# Patient Record
Sex: FEMALE | Race: BLACK OR AFRICAN AMERICAN | Employment: UNEMPLOYED | URBAN - METROPOLITAN AREA
[De-identification: names, ages, dates, MRNs, and addresses within clinical notes are randomized per-mention and may not be internally consistent; named-entity substitution may affect disease eponyms.]

---

## 2017-07-27 DIAGNOSIS — N89.8 VAGINAL DISCHARGE: ICD-10-CM

## 2017-07-27 DIAGNOSIS — Z20.2 EXPOSURE TO STD: ICD-10-CM

## 2017-07-27 LAB
CLUE CELLS: ABNORMAL
TRICHOMONAS PREP: ABNORMAL
TRICHOMONAS VAGINALIS SCREEN: NEGATIVE
YEAST WET PREP: ABNORMAL

## 2017-07-29 LAB
GENITAL CULTURE, ROUTINE: ABNORMAL
ORGANISM: ABNORMAL
ORGANISM: ABNORMAL
URINE CULTURE, ROUTINE: NORMAL

## 2017-08-01 ENCOUNTER — TELEPHONE (OUTPATIENT)
Dept: PRIMARY CARE CLINIC | Age: 27
End: 2017-08-01

## 2017-08-01 DIAGNOSIS — B37.31 YEAST INFECTION INVOLVING THE VAGINA AND SURROUNDING AREA: Primary | ICD-10-CM

## 2017-08-01 LAB
C TRACH DNA GENITAL QL NAA+PROBE: NEGATIVE
N. GONORRHOEAE DNA: NEGATIVE

## 2017-08-01 RX ORDER — FLUCONAZOLE 150 MG/1
150 TABLET ORAL ONCE
Qty: 1 TABLET | Refills: 0 | Status: SHIPPED | OUTPATIENT
Start: 2017-08-01 | End: 2017-08-01

## 2018-08-16 ENCOUNTER — HOSPITAL ENCOUNTER (EMERGENCY)
Age: 28
Discharge: HOME | End: 2018-08-16
Payer: COMMERCIAL

## 2018-08-16 VITALS
HEART RATE: 71 BPM | RESPIRATION RATE: 16 BRPM | WEIGHT: 145 LBS | DIASTOLIC BLOOD PRESSURE: 83 MMHG | TEMPERATURE: 98.6 F | OXYGEN SATURATION: 100 % | SYSTOLIC BLOOD PRESSURE: 110 MMHG | BODY MASS INDEX: 22.38 KG/M2

## 2018-08-16 DIAGNOSIS — N89.8 VAGINAL DISCHARGE: ICD-10-CM

## 2018-08-16 DIAGNOSIS — R30.0 DYSURIA: Primary | ICD-10-CM

## 2018-08-16 LAB
BILIRUBIN URINE: NEGATIVE
BLOOD, URINE: NEGATIVE
CHP ED QC CHECK: NORMAL
CLARITY: ABNORMAL
CLUE CELLS: NORMAL
COLOR: YELLOW
GLUCOSE URINE: NEGATIVE MG/DL
KETONES, URINE: NEGATIVE MG/DL
LEUKOCYTE ESTERASE, URINE: NEGATIVE
NITRITE, URINE: NEGATIVE
PH UA: 5.5 (ref 5–9)
PREGNANCY TEST URINE, POC: NEGATIVE
PROTEIN UA: NEGATIVE MG/DL
SPECIFIC GRAVITY UA: 1.03 (ref 1–1.03)
TRICHOMONAS PREP: NORMAL
TRICHOMONAS VAGINALIS SCREEN: NEGATIVE
URINE REFLEX TO CULTURE: ABNORMAL
UROBILINOGEN, URINE: 0.2 E.U./DL
YEAST WET PREP: NORMAL

## 2018-08-16 PROCEDURE — 99284 EMERGENCY DEPT VISIT MOD MDM: CPT

## 2018-08-16 PROCEDURE — 87210 SMEAR WET MOUNT SALINE/INK: CPT

## 2018-08-16 PROCEDURE — 81003 URINALYSIS AUTO W/O SCOPE: CPT

## 2018-08-16 PROCEDURE — 87591 N.GONORRHOEAE DNA AMP PROB: CPT

## 2018-08-16 PROCEDURE — 87660 TRICHOMONAS VAGIN DIR PROBE: CPT

## 2018-08-16 PROCEDURE — 6370000000 HC RX 637 (ALT 250 FOR IP): Performed by: NURSE PRACTITIONER

## 2018-08-16 PROCEDURE — 87491 CHLMYD TRACH DNA AMP PROBE: CPT

## 2018-08-16 RX ORDER — CHOLECALCIFEROL (VITAMIN D3) 125 MCG
500 CAPSULE ORAL DAILY
COMMUNITY

## 2018-08-16 RX ORDER — IBUPROFEN 800 MG/1
800 TABLET ORAL ONCE
Status: COMPLETED | OUTPATIENT
Start: 2018-08-16 | End: 2018-08-16

## 2018-08-16 RX ADMIN — IBUPROFEN 800 MG: 800 TABLET, FILM COATED ORAL at 09:06

## 2018-08-16 ASSESSMENT — PAIN SCALES - GENERAL
PAINLEVEL_OUTOF10: 10
PAINLEVEL_OUTOF10: 10

## 2018-08-16 ASSESSMENT — ENCOUNTER SYMPTOMS
EYE PAIN: 0
SORE THROAT: 0
RHINORRHEA: 0
ABDOMINAL PAIN: 1
VOMITING: 0
SHORTNESS OF BREATH: 0
PHOTOPHOBIA: 0
NAUSEA: 0
BACK PAIN: 0
DIARRHEA: 0
COUGH: 0

## 2018-08-16 ASSESSMENT — PAIN DESCRIPTION - LOCATION: LOCATION: OTHER (COMMENT)

## 2018-08-16 ASSESSMENT — PAIN DESCRIPTION - PAIN TYPE: TYPE: ACUTE PAIN

## 2018-08-16 NOTE — ED NOTES
Discharge instructions provided to patient. Patient has no questions. Pt informed to f/u with OB/GYN. Discharge signed. Pt ambulatory out of ED with steady gait.      Gold Lofton RN  08/16/18 9611

## 2018-08-16 NOTE — ED PROVIDER NOTES
tenderness. on exam the abdomen is soft, non-tender with good bowel sounds. There is no rebound, rigidity or guarding. No Peritoneal signs. There is no CVA tenderness. There is no abdominal or flank ecchymosis. Genitourinary:   Genitourinary Comments: Speculum exam with primary nurse. There is vaginal discharge. Exam is otherwise grossly unremarkable   Neurological: She is alert and oriented to person, place, and time. She has normal strength. Skin: Skin is warm, dry and intact. No rash noted. She is not diaphoretic. Nursing note and vitals reviewed. DIAGNOSTIC RESULTS     EKG: All EKG's are interpreted by the Emergency Department Physician who either signs or Co-signs this chart in the absence of a cardiologist.        RADIOLOGY:   Non-plain film images such as CT, Ultrasound and MRI are read by the radiologist. Plain radiographic images are visualized and preliminarily interpreted by the emergency physician with the below findings:        Interpretation per the Radiologist below, if available at the time of this note:    No orders to display         ED BEDSIDE ULTRASOUND:   Performed by ED Physician - none    LABS:  Labs Reviewed   URINE RT REFLEX TO CULTURE - Abnormal; Notable for the following:        Result Value    Clarity, UA CLOUDY (*)     All other components within normal limits   POC PREGNANCY UR-QUAL - Normal   WET PREP, GENITAL   C.TRACHOMATIS N.GONORRHOEAE DNA   TRICHOMONAS BY EIA       All other labs were within normal range or not returned as of this dictation. EMERGENCY DEPARTMENT COURSE and DIFFERENTIAL DIAGNOSIS/MDM:   Vitals:    Vitals:    08/16/18 0739 08/16/18 0900   BP: 112/61 110/83   Pulse: 81 71   Resp: 18 16   Temp: 98.5 °F (36.9 °C) 98.6 °F (37 °C)   TempSrc: Temporal Oral   SpO2: 98% 100%   Weight: 145 lb (65.8 kg)             MDM on exam she is nontoxic in no distress. Her vital signs are normal.  Laboratory studies were ordered for evaluation of her complaint. She'll be reevaluated. ppatient is reevaluated. Her exam remains unremarkable. Studies were reviewed with her. She'll be discharged home in stable condition and advised to follow up wit ObGyn. Standard anticipatory guidance given to patient upon discharge. Have given them a specific time frame in which to follow-up and who to follow-up with. I have also advised them that they should return to the emergency department if they get worse, or not getting better or develop any new or concerning symptoms. Patient demonstrates understanding and all questions were answered. CRITICAL CARE TIME       CONSULTS:  None    PROCEDURES:  Unless otherwise noted below, none     Procedures    FINAL IMPRESSION      1. Dysuria    2.  Vaginal discharge          DISPOSITION/PLAN   DISPOSITION Decision To Discharge 08/16/2018 08:55:40 AM      PATIENT REFERRED TO:  [facility]            DISCHARGE MEDICATIONS:  New Prescriptions    No medications on file          (Please note that portions of this note were completed with a voice recognition program.  Efforts were made to edit the dictations but occasionally words are mis-transcribed.)    MARQUISE Ng CNP (electronically signed)  Attending Emergency Physician          MARQUISE Ng CNP  08/16/18 0678

## 2018-08-16 NOTE — ED NOTES
Labs collected and sent. Vaginal exam done by Cj Ashby NP. Cultures sent.      Rosibel Ross RN  08/16/18 5961

## 2018-08-22 LAB
C TRACH DNA GENITAL QL NAA+PROBE: POSITIVE
N. GONORRHOEAE DNA: NEGATIVE

## 2018-10-23 ENCOUNTER — HOSPITAL ENCOUNTER (EMERGENCY)
Age: 28
Discharge: HOME | End: 2018-10-23
Payer: COMMERCIAL

## 2018-10-23 VITALS
TEMPERATURE: 98 F | RESPIRATION RATE: 20 BRPM | HEART RATE: 71 BPM | SYSTOLIC BLOOD PRESSURE: 120 MMHG | WEIGHT: 153 LBS | HEIGHT: 68 IN | OXYGEN SATURATION: 100 % | BODY MASS INDEX: 23.19 KG/M2 | DIASTOLIC BLOOD PRESSURE: 68 MMHG

## 2018-10-23 DIAGNOSIS — R35.0 URINARY FREQUENCY: Primary | ICD-10-CM

## 2018-10-23 DIAGNOSIS — N89.8 VAGINAL DISCHARGE: ICD-10-CM

## 2018-10-23 LAB
BILIRUBIN URINE: NEGATIVE
BLOOD, URINE: NEGATIVE
CHP ED QC CHECK: YES
CLARITY: ABNORMAL
COLOR: YELLOW
GLUCOSE BLD-MCNC: 80 MG/DL
GLUCOSE BLD-MCNC: 80 MG/DL (ref 60–115)
GLUCOSE URINE: NEGATIVE MG/DL
KETONES, URINE: NEGATIVE MG/DL
LEUKOCYTE ESTERASE, URINE: NEGATIVE
NITRITE, URINE: NEGATIVE
PERFORMED ON: NORMAL
PH UA: 7 (ref 5–9)
PROTEIN UA: NEGATIVE MG/DL
SPECIFIC GRAVITY UA: 1.03 (ref 1–1.03)
URINE REFLEX TO CULTURE: ABNORMAL
UROBILINOGEN, URINE: 1 E.U./DL

## 2018-10-23 PROCEDURE — 96372 THER/PROPH/DIAG INJ SC/IM: CPT

## 2018-10-23 PROCEDURE — 81003 URINALYSIS AUTO W/O SCOPE: CPT

## 2018-10-23 PROCEDURE — 87491 CHLMYD TRACH DNA AMP PROBE: CPT

## 2018-10-23 PROCEDURE — 6360000002 HC RX W HCPCS: Performed by: PHYSICIAN ASSISTANT

## 2018-10-23 PROCEDURE — 99283 EMERGENCY DEPT VISIT LOW MDM: CPT

## 2018-10-23 PROCEDURE — 87591 N.GONORRHOEAE DNA AMP PROB: CPT

## 2018-10-23 PROCEDURE — 2580000003 HC RX 258

## 2018-10-23 PROCEDURE — 6370000000 HC RX 637 (ALT 250 FOR IP): Performed by: PHYSICIAN ASSISTANT

## 2018-10-23 RX ORDER — CEFTRIAXONE SODIUM 250 MG/1
250 INJECTION, POWDER, FOR SOLUTION INTRAMUSCULAR; INTRAVENOUS ONCE
Status: COMPLETED | OUTPATIENT
Start: 2018-10-23 | End: 2018-10-23

## 2018-10-23 RX ORDER — AZITHROMYCIN 500 MG/1
1000 TABLET, FILM COATED ORAL DAILY
Status: COMPLETED | OUTPATIENT
Start: 2018-10-23 | End: 2018-10-23

## 2018-10-23 RX ADMIN — CEFTRIAXONE SODIUM 250 MG: 250 INJECTION, POWDER, FOR SOLUTION INTRAMUSCULAR; INTRAVENOUS at 17:15

## 2018-10-23 RX ADMIN — AZITHROMYCIN 1000 MG: 500 TABLET, FILM COATED ORAL at 17:15

## 2018-10-23 RX ADMIN — WATER 10 ML: 1 INJECTION INTRAMUSCULAR; INTRAVENOUS; SUBCUTANEOUS at 17:16

## 2018-10-23 ASSESSMENT — PAIN DESCRIPTION - LOCATION: LOCATION: VAGINA

## 2018-10-23 ASSESSMENT — PAIN DESCRIPTION - FREQUENCY: FREQUENCY: INTERMITTENT

## 2018-10-23 ASSESSMENT — ENCOUNTER SYMPTOMS
NAUSEA: 0
ABDOMINAL DISTENTION: 0
CONSTIPATION: 0
COLOR CHANGE: 0
SHORTNESS OF BREATH: 0
VOMITING: 0
RHINORRHEA: 0
ABDOMINAL PAIN: 0
EYE DISCHARGE: 0
DIARRHEA: 0
BACK PAIN: 0
SORE THROAT: 0

## 2018-10-23 ASSESSMENT — PAIN DESCRIPTION - PAIN TYPE: TYPE: ACUTE PAIN

## 2018-10-23 ASSESSMENT — PAIN DESCRIPTION - ONSET: ONSET: ON-GOING

## 2018-10-23 ASSESSMENT — PAIN DESCRIPTION - PROGRESSION: CLINICAL_PROGRESSION: GRADUALLY WORSENING

## 2018-10-23 ASSESSMENT — PAIN DESCRIPTION - DESCRIPTORS: DESCRIPTORS: PRESSURE

## 2018-10-23 ASSESSMENT — PAIN SCALES - GENERAL: PAINLEVEL_OUTOF10: 10

## 2018-10-23 NOTE — ED PROVIDER NOTES
exhibits no distension and no mass. There is no tenderness. There is no guarding. Musculoskeletal: Normal range of motion. She exhibits no edema or deformity. Neurological: She is oriented to person, place, and time. Coordination normal.       DIAGNOSTIC RESULTS     EKG: All EKG's are interpreted by the Emergency Department Physician who either signs or Co-signsthis chart in the absence of a cardiologist.        RADIOLOGY:   Ramon Collier such as CT, Ultrasound and MRI are read by the radiologist. Plain radiographic images are visualized and preliminarily interpreted by the emergency physician with the below findings:        Interpretation per the Radiologist below, if available at the time ofthis note:    No orders to display         ED BEDSIDE ULTRASOUND:   Performed by ED Physician - none    LABS:  Labs Reviewed   URINE RT REFLEX TO CULTURE - Abnormal; Notable for the following:        Result Value    Clarity, UA CLOUDY (*)     All other components within normal limits   POCT GLUCOSE - Normal   C.TRACHOMATIS N.GONORRHOEAE DNA, URINE   POCT GLUCOSE       All other labs were within normal range or not returned as of this dictation. EMERGENCY DEPARTMENT COURSE and DIFFERENTIAL DIAGNOSIS/MDM:   Vitals:    Vitals:    10/23/18 1545   BP: 116/80   Pulse: 78   Resp: 18   Temp: 97.8 °F (36.6 °C)   TempSrc: Oral   SpO2: 100%   Weight: 153 lb (69.4 kg)   Height: 5' 7.5\" (1.715 m)            MDM  Number of Diagnoses or Management Options  Diagnosis management comments: After initial evaluation the patient with a complaint of frequent urination urinalysis was sent off which has come back negative I also did a blood sugar check which came back negative as well at 80 mg/dL. Back in to discuss with the patient concerns were negative signs of urinary tract infection i.e. asked the patient if she was concerned about sexually transmitted diseases and she said that she was.   She states that her friend was just tested

## 2018-10-23 NOTE — ED NOTES
POCT Glucose done by this ED Tech. POCT Glucose results [id number] [facility] notified.       Rohini Moore  10/23/18 3708

## 2018-10-29 LAB
C. TRACHOMATIS DNA ,URINE: NEGATIVE
N. GONORRHOEAE DNA, URINE: NEGATIVE

## 2018-12-23 ENCOUNTER — OFFICE VISIT (OUTPATIENT)
Dept: PRIMARY CARE CLINIC | Age: 28
End: 2018-12-23
Payer: COMMERCIAL

## 2018-12-23 VITALS
HEART RATE: 90 BPM | WEIGHT: 156 LBS | BODY MASS INDEX: 23.64 KG/M2 | SYSTOLIC BLOOD PRESSURE: 120 MMHG | TEMPERATURE: 98.4 F | OXYGEN SATURATION: 98 % | DIASTOLIC BLOOD PRESSURE: 76 MMHG | RESPIRATION RATE: 16 BRPM | HEIGHT: 68 IN

## 2018-12-23 DIAGNOSIS — A08.4 VIRAL GASTROENTERITIS: Primary | ICD-10-CM

## 2018-12-23 PROCEDURE — G8420 CALC BMI NORM PARAMETERS: HCPCS | Performed by: NURSE PRACTITIONER

## 2018-12-23 PROCEDURE — G8427 DOCREV CUR MEDS BY ELIG CLIN: HCPCS | Performed by: NURSE PRACTITIONER

## 2018-12-23 PROCEDURE — G8484 FLU IMMUNIZE NO ADMIN: HCPCS | Performed by: NURSE PRACTITIONER

## 2018-12-23 PROCEDURE — 4004F PT TOBACCO SCREEN RCVD TLK: CPT | Performed by: NURSE PRACTITIONER

## 2018-12-23 PROCEDURE — 99213 OFFICE O/P EST LOW 20 MIN: CPT | Performed by: NURSE PRACTITIONER

## 2018-12-23 RX ORDER — ONDANSETRON 4 MG/1
4 TABLET, ORALLY DISINTEGRATING ORAL EVERY 8 HOURS PRN
Qty: 42 TABLET | Refills: 0 | Status: SHIPPED | OUTPATIENT
Start: 2018-12-23

## 2018-12-23 ASSESSMENT — ENCOUNTER SYMPTOMS
PHOTOPHOBIA: 0
SHORTNESS OF BREATH: 0
BLOATING: 1
EYE PAIN: 0
RECTAL PAIN: 0
BACK PAIN: 0
NAUSEA: 1
BLOOD IN STOOL: 0
HEMATEMESIS: 0
DIARRHEA: 0
VOMITING: 1
ODYNOPHAGIA: 0
HEMATOCHEZIA: 0
EYE REDNESS: 0
TENESMUS: 0
ABDOMINAL PAIN: 1
ABDOMINAL DISTENTION: 1
CONSTIPATION: 0
EYE ITCHING: 0
EYE DISCHARGE: 0
JAUNDICE: 0
CHEST TIGHTNESS: 0
COUGH: 0

## 2018-12-23 ASSESSMENT — PATIENT HEALTH QUESTIONNAIRE - PHQ9
DEPRESSION UNABLE TO ASSESS: PT REFUSES
DEPRESSION UNABLE TO ASSESS: PT REFUSES

## 2018-12-23 NOTE — PATIENT INSTRUCTIONS
clean. Wash your hands, cutting boards, and countertops with hot soapy water frequently. · Cook meat until it is well done. · Do not eat raw eggs or uncooked sauces made with raw eggs. · Do not take chances. If food looks or tastes spoiled, throw it out. When should you call for help? Call 911 anytime you think you may need emergency care. For example, call if:    · You vomit blood or what looks like coffee grounds.     · You passed out (lost consciousness).     · You pass maroon or very bloody stools.    Call your doctor now or seek immediate medical care if:    · You have severe belly pain.     · You have signs of needing more fluids. You have sunken eyes, a dry mouth, and pass only a little dark urine.     · You feel like you are going to faint.     · You have increased belly pain that does not go away in 1 to 2 days.     · You have new or increased nausea, or you are vomiting.     · You have a new or higher fever.     · Your stools are black and tarlike or have streaks of blood.    Watch closely for changes in your health, and be sure to contact your doctor if:    · You are dizzy or lightheaded.     · You urinate less than usual, or your urine is dark yellow or brown.     · You do not feel better with each day that goes by. Where can you learn more? Go to https://UnveilpesalvadorIdealSeat.SimpleTuition. org and sign in to your [employer] account. Enter N142 in the [facility] box to learn more about \"Gastroenteritis: Care Instructions. \"     If you do not have an account, please click on the \"Sign Up Now\" link. Current as of: November 18, 2017  Content Version: 11.8  © 4395-0137 [employer]. Care instructions adapted under license by [facility] ([facility]). If you have questions about a medical condition or this instruction, always ask your healthcare professional. [address]  any warranty or liability for your use of this information.

## 2018-12-23 NOTE — PROGRESS NOTES
Subjective     Brittneylar Hilary Carballo 33 y.o. female presents 12/23/18 with   Chief Complaint   Patient presents with    Emesis     today pt started having nausea, vomitng, abdominal pain (cramping). GI Problem   The primary symptoms include fatigue, abdominal pain (cramping), nausea, vomiting (x4 today; improving as the day goes on) and myalgias. Primary symptoms do not include fever, weight loss, diarrhea, melena, hematemesis, jaundice, hematochezia, dysuria, arthralgias or rash. The illness began today. The onset was sudden. The problem has been gradually improving. The fatigue began today. The fatigue has been improving since its onset. The abdominal pain began today. The abdominal pain has been gradually improving since its onset. The abdominal pain is generalized. The abdominal pain does not radiate. The abdominal pain is relieved by certain positions. Nausea began today. The illness is also significant for anorexia and bloating. The illness does not include chills, dysphagia, odynophagia, constipation (did have BM this morning), tenesmus, back pain or itching. Reviewed the following history:    History reviewed. No pertinent past medical history. Past Surgical History:   Procedure Laterality Date    BREAST SURGERY  2001    L breast cyst removal     History reviewed. No pertinent family history. Allergies   Allergen Reactions    Sulfa Antibiotics        Current Outpatient Prescriptions   Medication Sig Dispense Refill    ondansetron (ZOFRAN ODT) 4 MG disintegrating tablet Take 1 tablet by mouth every 8 hours as needed for Nausea or Vomiting 42 tablet 0    vitamin B-12 (CYANOCOBALAMIN) 500 MCG tablet Take 500 mcg by mouth daily      vitamin D (CHOLECALCIFEROL) 1000 UNIT TABS tablet Take 1,000 Units by mouth daily       No current facility-administered medications for this visit. Review of Systems   Constitutional: Positive for appetite change and fatigue.  Negative for

## 2019-11-07 ENCOUNTER — HOSPITAL ENCOUNTER (OUTPATIENT)
Dept: NEUROLOGY | Age: 29
Discharge: HOME | End: 2019-11-07
Payer: COMMERCIAL

## 2019-11-07 PROCEDURE — 95816 EEG AWAKE AND DROWSY: CPT | Performed by: PSYCHIATRY & NEUROLOGY

## 2019-11-07 PROCEDURE — 95816 EEG AWAKE AND DROWSY: CPT

## 2019-12-19 LAB — HIV 1,2 COMBO ANTIGEN/ANTIBODY: NEGATIVE

## 2020-11-28 ENCOUNTER — APPOINTMENT (OUTPATIENT)
Dept: CT IMAGING | Age: 30
End: 2020-11-28
Payer: COMMERCIAL

## 2020-11-28 ENCOUNTER — HOSPITAL ENCOUNTER (EMERGENCY)
Age: 30
Discharge: HOME | End: 2020-11-28
Attending: EMERGENCY MEDICINE
Payer: COMMERCIAL

## 2020-11-28 VITALS
SYSTOLIC BLOOD PRESSURE: 115 MMHG | TEMPERATURE: 98.3 F | WEIGHT: 165 LBS | RESPIRATION RATE: 20 BRPM | OXYGEN SATURATION: 97 % | BODY MASS INDEX: 25.01 KG/M2 | HEIGHT: 68 IN | DIASTOLIC BLOOD PRESSURE: 87 MMHG | HEART RATE: 97 BPM

## 2020-11-28 LAB
ETHANOL PERCENT: 0.18 G/DL
ETHANOL: 202 MG/DL (ref 0–0.08)
HCG QUALITATIVE: POSITIVE

## 2020-11-28 PROCEDURE — 84703 CHORIONIC GONADOTROPIN ASSAY: CPT

## 2020-11-28 PROCEDURE — 70450 CT HEAD/BRAIN W/O DYE: CPT

## 2020-11-28 PROCEDURE — G0480 DRUG TEST DEF 1-7 CLASSES: HCPCS

## 2020-11-28 PROCEDURE — 99284 EMERGENCY DEPT VISIT MOD MDM: CPT

## 2020-11-28 PROCEDURE — 36415 COLL VENOUS BLD VENIPUNCTURE: CPT

## 2020-11-28 ASSESSMENT — ENCOUNTER SYMPTOMS
NAUSEA: 0
ABDOMINAL DISTENTION: 0
CHEST TIGHTNESS: 0
SORE THROAT: 0
PHOTOPHOBIA: 0
SHORTNESS OF BREATH: 0
VOMITING: 0
EYE DISCHARGE: 0
WHEEZING: 0
COUGH: 0
ABDOMINAL PAIN: 0

## 2020-11-28 NOTE — ED NOTES
Attempt to dopple FHT with no success. Patient encouraged by provider to follow-up with OB/GYN for further care.    Silas Gautam, RN  11/28/20 [address], RN  11/28/20 4613

## 2020-11-28 NOTE — ED NOTES
Patient agreeable to have blood drawn. Blood specimens obtained and sent to lab.      Parish Cuellar RN  11/28/20 8496

## 2020-11-28 NOTE — ED NOTES
Patient is refusing all care at this time. Attempt to arouse patient who answers \"ok\" to nurse requests but will not physically get up to perform tasks such as urine specimen for Urine preg test and CT.      Daniel Medrano, RN  11/28/20 Kylee Calix RN  11/28/20 2968

## 2020-11-28 NOTE — ED NOTES
Discharge instructions to patient who voices understanding. Patient is ambulatory from ED to lobby to wait for a ride.      Mariola Kumar RN  11/28/20 2465

## 2023-04-06 ENCOUNTER — HOSPITAL ENCOUNTER (OUTPATIENT)
Dept: DATA CONVERSION | Facility: HOSPITAL | Age: 33
End: 2023-04-06
Attending: PAIN MEDICINE | Admitting: PAIN MEDICINE
Payer: COMMERCIAL

## 2023-04-06 DIAGNOSIS — M47.816 SPONDYLOSIS WITHOUT MYELOPATHY OR RADICULOPATHY, LUMBAR REGION: ICD-10-CM

## 2023-04-06 DIAGNOSIS — Z88.2 ALLERGY STATUS TO SULFONAMIDES: ICD-10-CM

## 2023-05-25 LAB
CHLAMYDIA TRACH., AMPLIFIED: NEGATIVE
CLUE CELLS: PRESENT
N. GONORRHEA, AMPLIFIED: NEGATIVE
NUGENT SCORE: 8
TRICHOMONAS VAGINALIS: NEGATIVE
YEAST: ABNORMAL

## 2023-05-26 LAB — URINE CULTURE: NORMAL

## 2023-08-22 ENCOUNTER — HOSPITAL ENCOUNTER (OUTPATIENT)
Dept: DATA CONVERSION | Facility: HOSPITAL | Age: 33
End: 2023-08-22
Attending: PAIN MEDICINE | Admitting: PAIN MEDICINE
Payer: COMMERCIAL

## 2023-08-22 DIAGNOSIS — M47.816 SPONDYLOSIS WITHOUT MYELOPATHY OR RADICULOPATHY, LUMBAR REGION: ICD-10-CM

## 2023-10-01 NOTE — OP NOTE
PROCEDURE DETAILS    Preoperative Diagnosis:  Lumbar spondylosis, M47.816    Postoperative Diagnosis:  Lumbar spondylosis, M47.816    Surgeon: Nati Pastrana  Resident/Fellow/Other Assistant: Nita Cooper    Procedure:  Radiofrequency ablation bilateral   medial nerve branch L3-4 L4-5    Anesthesia: 2 mg of Versed   Estimated Blood Loss: 0  Findings: None         Operative Report:   Surgical Indications  The patient is here today to receive a radiofrequency ablation of the above-mentioned nerve to assist with the pain condition.    Operative Report  The patient was taken to the procedure room, was placed into a prone positioning. The skin was prepped and draped sterilely in the normal fashion.  Under fluoroscopic guidance the medial nerve root branches were identified and the patient was throughout the procedure monitored by the nursing staff. The skin and subcutaneous tissues were anesthetized with 1% lidocaine. Then 20-gauge RF needles were  introduced into the approximation of the medial nerve branches at the above mentioned level and confirmed in AP and oblique view, with negative aspiration of heme and CSF, with positive sensory stimulation and negative motor stimulation. Then the patient  received 1 mL of 0.5% bupivacaine per site and radiofrequency ablation at temperature of 80°C for 90 seconds was achieved. Patient tolerated the procedure well, was taken to the recovery room, allowed to recover for a sufficient amount of time and  then was discharged home in stable condition. The patient was advised to followup with the [facility] to reassess the improvement on as-needed basis. Take you for allowing me to participate in the care of this patient.                        Attestation:   Note Completion:  Attending Attestation I performed the procedure without a resident         Electronic Signatures:  Nati Pastrana)  (Signed 22-Aug-2023 08:52)   Authored: Post-Operative Note, Chart  Review, Note Completion      Last Updated: 22-Aug-2023 08:52 by Nati Pastrana)

## 2023-10-02 NOTE — OP NOTE
PROCEDURE DETAILS    Preoperative Diagnosis:  Lumbar spondylosis, M47.816    Postoperative Diagnosis:  Lumbar spondylosis, M47.816    Surgeon: Nati Pastrana  Resident/Fellow/Other Assistant: Nita Cooper    Procedure:  Medial nerve branch block bilateral L3-L4, L4-L5    Anesthesia: No anesthesiologist associated with this case  Estimated Blood Loss: 0  Findings: None         Operative Report:   Clinical Note  The patient is here today to receive diagnostic medial nerve branch block to assist with pain.    Procedure Note  The patient was taken to the procedure room, was placed into a prone position. The area was prepped and draped sterilely in the normal fashion. The patient was throughout the procedure monitored by the nursing staff. The skin and subcutaneous tissue was  anesthetized with 1% lidocaine. Then 22-gauge spinal needles were introduced into the approximation of the medial nerve branches at the above mentioned level, confirmed in AP and oblique view with negative aspiration of heme and CSF. The patient received  0.5 mL of 0.5% Marcaine per site. The patient tolerated the procedure well, was taken to the recovery room, allowed to recover for sufficient amount of time and then was discharged home in stable condition. The patient was advised to followup with the  [facility] to reassess the improvement and determine the need for the radiofrequency ablation.    Thank you for allowing me to participate in the care of this patient.                        Attestation:   Note Completion:  Attending Attestation I performed the procedure without a resident         Electronic Signatures:  Nati Pastrana)  (Signed 06-Apr-2023 10:14)   Authored: Post-Operative Note, Chart Review, Note Completion      Last Updated: 06-Apr-2023 10:14 by Nati Pastrana)

## 2023-10-24 ENCOUNTER — TELEPHONE (OUTPATIENT)
Dept: PAIN MEDICINE | Facility: CLINIC | Age: 33
End: 2023-10-24
Payer: COMMERCIAL

## 2023-12-22 ENCOUNTER — OFFICE VISIT (OUTPATIENT)
Dept: PAIN MEDICINE | Facility: CLINIC | Age: 33
End: 2023-12-22
Payer: COMMERCIAL

## 2023-12-22 VITALS
RESPIRATION RATE: 16 BRPM | DIASTOLIC BLOOD PRESSURE: 71 MMHG | HEART RATE: 72 BPM | SYSTOLIC BLOOD PRESSURE: 111 MMHG | TEMPERATURE: 96.4 F

## 2023-12-22 DIAGNOSIS — G89.29 OTHER CHRONIC PAIN: ICD-10-CM

## 2023-12-22 DIAGNOSIS — G89.29 CHRONIC BILATERAL LOW BACK PAIN, UNSPECIFIED WHETHER SCIATICA PRESENT: Primary | ICD-10-CM

## 2023-12-22 DIAGNOSIS — M06.9 RHEUMATOID ARTHRITIS INVOLVING MULTIPLE SITES, UNSPECIFIED WHETHER RHEUMATOID FACTOR PRESENT (MULTI): ICD-10-CM

## 2023-12-22 DIAGNOSIS — M54.50 LOW BACK PAIN, UNSPECIFIED: ICD-10-CM

## 2023-12-22 DIAGNOSIS — M54.50 CHRONIC BILATERAL LOW BACK PAIN, UNSPECIFIED WHETHER SCIATICA PRESENT: Primary | ICD-10-CM

## 2023-12-22 DIAGNOSIS — M54.50 CHRONIC BILATERAL LOW BACK PAIN WITHOUT SCIATICA: ICD-10-CM

## 2023-12-22 DIAGNOSIS — G89.29 CHRONIC BILATERAL LOW BACK PAIN WITHOUT SCIATICA: ICD-10-CM

## 2023-12-22 PROCEDURE — 99213 OFFICE O/P EST LOW 20 MIN: CPT | Mod: ZK

## 2023-12-22 PROCEDURE — 99213 OFFICE O/P EST LOW 20 MIN: CPT

## 2023-12-22 RX ORDER — LIDOCAINE 50 MG/G
1 PATCH TOPICAL DAILY
Qty: 30 PATCH | Refills: 2 | Status: SHIPPED | OUTPATIENT
Start: 2023-12-22 | End: 2024-01-21

## 2023-12-22 RX ORDER — GABAPENTIN 300 MG/1
300 CAPSULE ORAL 4 TIMES DAILY
Qty: 120 CAPSULE | Refills: 2 | Status: SHIPPED | OUTPATIENT
Start: 2023-12-22 | End: 2024-03-21

## 2023-12-22 ASSESSMENT — PAIN SCALES - GENERAL: PAINLEVEL: 10-WORST PAIN EVER

## 2023-12-22 NOTE — PROGRESS NOTES
Subjective   Patient ID: Chaparrita Larry is a 33 y.o. female who presents for Med Refill.  HPI      32 yo female presents today for medication refills. She is known to this clinic for chronic low back pain and is maintained on gabapentin 300mg TID. She reports that the medication is not as effective as it used to be in managing her pain. She denies any side effects associated with the usage of gabapentin. She reports that she was recently diagnosed with lupus and rheumatoid arthritis by her PCP, she is requesting a referral for rheumatology. She was started on hydroxychloroquine. Today she is rating her pain as 10/10, pain is in the low back and all over her body. Low back pain is described as achy and does not radiate. She had RFA bilateral medial nerve branch blocks L3- 4, L4- 5 in August 2023 and she denies any relief from this procedure. Noted on OAR[facility] Tramadol 50mg BID prescribed from multiple providers. Denies any recent falls or injuries, denies bowel on bladder incontinence.         Review of Systems    All 13 systems were reviewed and are within normal levels except as noted below or per HPI. Positive and pertinent negative responses are noted below or in the HPI   Denied any fever or chills. No weight loss and no night sweats. No cough or sputum production. No diarrhea   No bladder and bowel incontinence and no other changes in bladder and bowel. No skin changes. Reports tiredness and fatigability only if the pain is not controlled.   Denied opioids diversion and abuse and denies alcoholism. Denies overuse of the pain medications.     Objective   Physical Exam  General   Alert and oriented x4, pleasant and cooperative.      HEENT  Pupils are equal and normal in size. Ears, nose, mouth, and throat appear to be WNL.  Head atraumatic, symmetric.      No signs of sedation or signs of withdrawal apparent.     Psychiatric   No signs of depression apparent. Appropriate mood and affect.     Neuro   No focal  neurological deficit apparent. Ambulation     Respiratory  No respiratory distress, respirations equal and unlabored.     Abdomen  Soft and nontender, no distention noted.     Skin  Warm, dry and intact. No skin markings supportive of recent IV drug usage .            Assessment/Plan        32 yo female with history and physical exam supportive of chronic low back pain associated with lumbago and lumbar spondylosis. Multiple joint pain associated with rheumatoid arthritis.     Increase gabapentin to 300mg 4 times daily.   Advised to use lidocaine patches for additional pain relief.   Referral for aquatic therapy and rheumatology provided.   Follow up in 3 months or as needed.     Debra Mora, MARTIN-CNP 12/22/23 8:07 AM

## 2023-12-22 NOTE — PATIENT INSTRUCTIONS
Increase dose of gabapentin to 300mg 4 times daily.     May use lidocaine patches for additional pain relief.     Follow up in 3 months or as needed.

## 2024-01-25 ENCOUNTER — ALLIED HEALTH (OUTPATIENT)
Dept: INTEGRATIVE MEDICINE | Facility: CLINIC | Age: 34
End: 2024-01-25
Payer: COMMERCIAL

## 2024-01-25 DIAGNOSIS — M54.59 OTHER LOW BACK PAIN: Primary | ICD-10-CM

## 2024-01-25 PROCEDURE — 97811 ACUP 1/> W/O ESTIM EA ADD 15: CPT

## 2024-01-25 PROCEDURE — 97810 ACUP 1/> WO ESTIM 1ST 15 MIN: CPT

## 2024-01-25 NOTE — PROGRESS NOTES
Acupuncture Visit:     Subjective   Patient ID: Chaparrita Larry is a 33 y.o. female who presents for Back Pain    This patient is an established patient who was previously seen and documented under [facility].  She was previously seen for chronic low back pain which she reports is the reason for her visit today.  Low back feels sore and achy after certain activities.  Pain level is around 8/10 today.        Session Information  Is this acupuncture treatment being billed to the patient's insurance company: Yes  This is visit number: 1  The patient has a total number of visits of: 30  Name of Insurance Company: [facility]  Visit Type: Follow-up visit  Medical History Reviewed: I have reviewed pertinent medical history in EHR, and no contraindications are present to provide treatment         Review of Systems         Provider reviewed plan for the acupuncture session, precautions and contraindications. Patient/guardian/hospital staff has given consent to treat with full understanding of what to expect during the session. Before acupuncture began, provider explained to the patient to communicate at any time if the procedure was causing discomfort past their tolerance level. Patient agreed to advise acupuncturist. The acupuncturist counseled the patient on the risks of acupuncture treatment including pain, infection, bleeding, and no relief of pain. The patient was positioned comfortably. There was no evidence of infection at the site of needle insertions.    Objective   Physical Exam         Treatment Plan  Treatment Goals: Pain management, Wellbeing improvement, Relaxation    Acupuncture Treatment  Patient Position: Prone on a table  Acupuncture Needling: Yes  Needle Guage: 36 guage /.20/ Blue seirin  Body Points: With retention  Body Points - Bilateral: BL23,28,29,30,57,62; KD3; HTJJ L2-4  Auricular Points: No  Electroacupuncture Used: No  Other Techniques Utilized: TDP Lamp, Topicals, Massage  Massage Description:  Moderate Pressure  Topicals Description: Devon Fortune  TDP Lamp Descripton: 20mins on low back  Needle Count In: 20  Needle Count Out: 20  Needle Retention Time (min): 30 minutes  Total Face to Face Time (min): 30 minutes              Assessment/Plan   Diagnoses and all orders for this visit:  Other low back pain

## 2024-08-08 ENCOUNTER — APPOINTMENT (OUTPATIENT)
Dept: RHEUMATOLOGY | Facility: CLINIC | Age: 34
End: 2024-08-08
Payer: COMMERCIAL

## 2024-08-29 ENCOUNTER — OFFICE VISIT (OUTPATIENT)
Dept: PRIMARY CARE | Facility: CLINIC | Age: 34
End: 2024-08-29
Payer: COMMERCIAL

## 2024-08-29 VITALS
TEMPERATURE: 97.8 F | SYSTOLIC BLOOD PRESSURE: 110 MMHG | WEIGHT: 179.4 LBS | RESPIRATION RATE: 20 BRPM | BODY MASS INDEX: 28.1 KG/M2 | DIASTOLIC BLOOD PRESSURE: 82 MMHG | HEART RATE: 96 BPM | OXYGEN SATURATION: 97 %

## 2024-08-29 DIAGNOSIS — R19.7 DIARRHEA OF PRESUMED INFECTIOUS ORIGIN: ICD-10-CM

## 2024-08-29 DIAGNOSIS — R35.0 INCREASED FREQUENCY OF URINATION: Primary | ICD-10-CM

## 2024-08-29 PROBLEM — D68.59 HYPERCOAGULABLE STATE (MULTI): Status: ACTIVE | Noted: 2024-04-05

## 2024-08-29 PROBLEM — D68.62 LUPUS ANTICOAGULANT DISORDER (MULTI): Status: ACTIVE | Noted: 2024-04-26

## 2024-08-29 PROBLEM — M06.9 RHEUMATOID ARTHRITIS INVOLVING MULTIPLE JOINTS (MULTI): Status: ACTIVE | Noted: 2024-08-29

## 2024-08-29 PROBLEM — B00.9 HERPES SIMPLEX TYPE 2 INFECTION: Status: ACTIVE | Noted: 2024-08-29

## 2024-08-29 PROBLEM — F12.20: Chronic | Status: ACTIVE | Noted: 2022-10-05

## 2024-08-29 PROBLEM — G89.29 CHRONIC BILATERAL THORACIC BACK PAIN: Status: ACTIVE | Noted: 2024-08-29

## 2024-08-29 PROBLEM — M54.6 CHRONIC BILATERAL THORACIC BACK PAIN: Status: ACTIVE | Noted: 2024-08-29

## 2024-08-29 PROBLEM — D47.2 IGG MONOCLONAL GAMMOPATHY OF UNCERTAIN SIGNIFICANCE: Status: ACTIVE | Noted: 2024-03-31

## 2024-08-29 LAB
POC APPEARANCE, URINE: CLEAR
POC BILIRUBIN, URINE: NEGATIVE
POC BLOOD, URINE: NEGATIVE
POC COLOR, URINE: YELLOW
POC GLUCOSE, URINE: NEGATIVE MG/DL
POC KETONES, URINE: NEGATIVE MG/DL
POC LEUKOCYTES, URINE: ABNORMAL
POC NITRITE,URINE: NEGATIVE
POC PH, URINE: 5 PH
POC PROTEIN, URINE: NEGATIVE MG/DL
POC SPECIFIC GRAVITY, URINE: 1.01
POC UROBILINOGEN, URINE: 0.2 EU/DL

## 2024-08-29 PROCEDURE — 87205 SMEAR GRAM STAIN: CPT

## 2024-08-29 PROCEDURE — 81002 URINALYSIS NONAUTO W/O SCOPE: CPT | Performed by: FAMILY MEDICINE

## 2024-08-29 PROCEDURE — 99214 OFFICE O/P EST MOD 30 MIN: CPT | Performed by: FAMILY MEDICINE

## 2024-08-29 PROCEDURE — 87086 URINE CULTURE/COLONY COUNT: CPT

## 2024-08-29 RX ORDER — ARIPIPRAZOLE 960 MG/3.2ML
INJECTION, SUSPENSION, EXTENDED RELEASE INTRAMUSCULAR
COMMUNITY
Start: 2023-11-24

## 2024-08-29 RX ORDER — HYDROXYCHLOROQUINE SULFATE 200 MG/1
TABLET, FILM COATED ORAL
COMMUNITY
Start: 2023-08-12

## 2024-08-29 RX ORDER — BENZOYL PEROXIDE 50 MG/ML
LIQUID TOPICAL
COMMUNITY
Start: 2024-01-23

## 2024-08-29 RX ORDER — NITROFURANTOIN 25; 75 MG/1; MG/1
100 CAPSULE ORAL 2 TIMES DAILY
Qty: 10 CAPSULE | Refills: 0 | Status: SHIPPED | OUTPATIENT
Start: 2024-08-29 | End: 2024-09-03

## 2024-08-29 RX ORDER — ACETAMINOPHEN 500 MG
TABLET ORAL
COMMUNITY

## 2024-08-29 RX ORDER — TRAMADOL HYDROCHLORIDE 50 MG/1
TABLET ORAL
COMMUNITY
Start: 2023-05-23

## 2024-08-29 RX ORDER — FOLIC ACID 1 MG/1
TABLET ORAL
COMMUNITY
Start: 2024-08-07

## 2024-08-29 RX ORDER — AZATHIOPRINE 50 MG/1
TABLET ORAL
COMMUNITY
Start: 2024-05-07

## 2024-08-29 RX ORDER — VALACYCLOVIR HYDROCHLORIDE 500 MG/1
1 TABLET, FILM COATED ORAL 2 TIMES DAILY
COMMUNITY
Start: 2022-10-19

## 2024-08-29 RX ORDER — SERTRALINE HYDROCHLORIDE 100 MG/1
TABLET, FILM COATED ORAL
COMMUNITY
Start: 2022-08-05

## 2024-08-29 ASSESSMENT — ENCOUNTER SYMPTOMS
NAUSEA: 1
CONSTIPATION: 0
SHORTNESS OF BREATH: 0
VOMITING: 0
WHEEZING: 0
DIARRHEA: 1
HEMATURIA: 0
FEVER: 0
DYSURIA: 0
FREQUENCY: 1
SWEATS: 0
ABDOMINAL PAIN: 1
COUGH: 0
BLOOD IN STOOL: 0
FATIGUE: 0

## 2024-08-29 NOTE — ASSESSMENT & PLAN NOTE
Pt ua positive, will send uc  Pt to take atbx as directed  Increase flds  F/up with pcp if cont symptoms

## 2024-08-29 NOTE — ASSESSMENT & PLAN NOTE
Pt ordered stool studies, h pylori  Will notify pt if any positive results  Avoid fatty foods, large meals  May use otc imodium as needed  F/up with pcp if cont problems

## 2024-08-29 NOTE — PROGRESS NOTES
Subjective   Patient ID: Chaparrita Larry is a 34 y.o. female who presents for UTI.    UTI   This is a new problem. The current episode started in the past 7 days. The problem occurs every urination. The problem has been gradually worsening. The quality of the pain is described as aching (lower abdominal discomfort). The pain is mild. There has been no fever. She is Not sexually active. There is No history of pyelonephritis. Associated symptoms include frequency, hesitancy, nausea and urgency. Pertinent negatives include no hematuria, possible pregnancy, sweats or vomiting. Associated symptoms comments: Diarrhea  Strong urine smell. She has tried nothing for the symptoms. The treatment provided mild relief.        Review of Systems   Constitutional:  Negative for fatigue and fever.   Respiratory:  Negative for cough, shortness of breath and wheezing.    Cardiovascular:  Negative for chest pain.   Gastrointestinal:  Positive for abdominal pain, diarrhea and nausea. Negative for blood in stool, constipation and vomiting.        Diarrhea x 2 wk, 1-2 x/d,  cramping with diarrhea  No travel hx  No family ill  No new diet   Genitourinary:  Positive for frequency, hesitancy and urgency. Negative for dysuria, hematuria, vaginal bleeding and vaginal discharge.        Duration 1 wk  Hx of uti  LMP this month  Is not sexually active  Not on birth control       Objective   /82   Pulse 96   Temp 36.6 °C (97.8 °F)   Resp 20   Wt 81.4 kg (179 lb 6.4 oz)   SpO2 97%   BMI 28.10 kg/m²     Physical Exam  Vitals and nursing note reviewed.   Constitutional:       General: She is not in acute distress.     Appearance: Normal appearance.   HENT:      Head: Normocephalic and atraumatic.   Cardiovascular:      Rate and Rhythm: Normal rate and regular rhythm.      Heart sounds: Normal heart sounds.   Pulmonary:      Effort: Pulmonary effort is normal.      Breath sounds: Normal breath sounds.   Abdominal:      General: Bowel  sounds are normal.      Palpations: Abdomen is soft. There is no mass.      Tenderness: There is no abdominal tenderness. There is no right CVA tenderness, left CVA tenderness, guarding or rebound.   Skin:     General: Skin is warm and dry.   Neurological:      Mental Status: She is alert.         Assessment/Plan   Problem List Items Addressed This Visit             ICD-10-CM    Increased frequency of urination - Primary R35.0     Pt ua positive, will send uc  Pt to take atbx as directed  Increase flds  F/up with pcp if cont symptoms         Relevant Medications    nitrofurantoin, macrocrystal-monohydrate, (Macrobid) 100 mg capsule    Other Relevant Orders    POCT UA (nonautomated) manually resulted (Completed)    Urine Culture    Vaginitis Gram Stain For Bacterial Vaginosis + Yeast    Diarrhea of presumed infectious origin R19.7     Pt ordered stool studies, h pylori  Will notify pt if any positive results  Avoid fatty foods, large meals  May use otc imodium as needed  F/up with pcp if cont problems         Relevant Orders    H. Pylori Breath Test    C. difficile, PCR    Ova/Para + Giardia/Cryptosporidium Antigen    Stool Pathogen Panel, PCR

## 2024-08-30 LAB
BACTERIA UR CULT: NORMAL
CLUE CELLS VAG LPF-#/AREA: ABNORMAL /[LPF]
NUGENT SCORE: 1
YEAST VAG WET PREP-#/AREA: PRESENT

## 2024-09-03 ENCOUNTER — TELEPHONE (OUTPATIENT)
Dept: PRIMARY CARE | Facility: CLINIC | Age: 34
End: 2024-09-03
Payer: COMMERCIAL

## 2024-09-03 DIAGNOSIS — B37.9 YEAST INFECTION: Primary | ICD-10-CM

## 2024-09-03 RX ORDER — FLUCONAZOLE 150 MG/1
150 TABLET ORAL ONCE
Qty: 1 TABLET | Refills: 0 | Status: SHIPPED | OUTPATIENT
Start: 2024-09-03 | End: 2024-09-03

## 2024-09-03 NOTE — TELEPHONE ENCOUNTER
Spoke to patient to let her know that she does have a yeast infection and that diflucan was sent into pharmacy.

## 2024-09-03 NOTE — TELEPHONE ENCOUNTER
----- Message from Nayla Hannon sent at 9/3/2024  7:35 AM EDT -----  Call pt, has years inf, will send in diflucan

## 2024-09-17 ENCOUNTER — APPOINTMENT (OUTPATIENT)
Dept: RHEUMATOLOGY | Facility: CLINIC | Age: 34
End: 2024-09-17
Payer: COMMERCIAL

## 2024-09-26 ENCOUNTER — APPOINTMENT (OUTPATIENT)
Dept: OBSTETRICS AND GYNECOLOGY | Facility: CLINIC | Age: 34
End: 2024-09-26
Payer: COMMERCIAL

## 2024-12-28 ENCOUNTER — APPOINTMENT (OUTPATIENT)
Dept: RADIOLOGY | Facility: HOSPITAL | Age: 34
End: 2024-12-28
Payer: COMMERCIAL

## 2024-12-28 ENCOUNTER — HOSPITAL ENCOUNTER (EMERGENCY)
Facility: HOSPITAL | Age: 34
Discharge: HOME | End: 2024-12-28
Attending: EMERGENCY MEDICINE
Payer: COMMERCIAL

## 2024-12-28 VITALS
RESPIRATION RATE: 14 BRPM | TEMPERATURE: 98.2 F | DIASTOLIC BLOOD PRESSURE: 74 MMHG | WEIGHT: 170 LBS | HEIGHT: 67 IN | BODY MASS INDEX: 26.68 KG/M2 | OXYGEN SATURATION: 100 % | HEART RATE: 72 BPM | SYSTOLIC BLOOD PRESSURE: 119 MMHG

## 2024-12-28 DIAGNOSIS — R10.84 ABDOMINAL PAIN, GENERALIZED: ICD-10-CM

## 2024-12-28 DIAGNOSIS — N30.00 ACUTE CYSTITIS WITHOUT HEMATURIA: Primary | ICD-10-CM

## 2024-12-28 DIAGNOSIS — Z34.91 FIRST TRIMESTER PREGNANCY (HHS-HCC): ICD-10-CM

## 2024-12-28 LAB
APPEARANCE UR: CLEAR
B-HCG SERPL-ACNC: ABNORMAL MIU/ML
BACTERIA #/AREA URNS AUTO: ABNORMAL /HPF
BILIRUB UR STRIP.AUTO-MCNC: NEGATIVE MG/DL
COLOR UR: YELLOW
GLUCOSE UR STRIP.AUTO-MCNC: NORMAL MG/DL
HCG UR QL IA.RAPID: POSITIVE
KETONES UR STRIP.AUTO-MCNC: NEGATIVE MG/DL
LEUKOCYTE ESTERASE UR QL STRIP.AUTO: ABNORMAL
MUCOUS THREADS #/AREA URNS AUTO: ABNORMAL /LPF
NITRITE UR QL STRIP.AUTO: NEGATIVE
PH UR STRIP.AUTO: 6 [PH]
PROT UR STRIP.AUTO-MCNC: ABNORMAL MG/DL
RBC # UR STRIP.AUTO: NEGATIVE /UL
RBC #/AREA URNS AUTO: ABNORMAL /HPF
SP GR UR STRIP.AUTO: 1.03
SQUAMOUS #/AREA URNS AUTO: ABNORMAL /HPF
UROBILINOGEN UR STRIP.AUTO-MCNC: NORMAL MG/DL
WBC #/AREA URNS AUTO: ABNORMAL /HPF

## 2024-12-28 PROCEDURE — 87086 URINE CULTURE/COLONY COUNT: CPT | Mod: STJLAB

## 2024-12-28 PROCEDURE — 99284 EMERGENCY DEPT VISIT MOD MDM: CPT | Mod: 25 | Performed by: EMERGENCY MEDICINE

## 2024-12-28 PROCEDURE — 76801 OB US < 14 WKS SINGLE FETUS: CPT

## 2024-12-28 PROCEDURE — 81025 URINE PREGNANCY TEST: CPT

## 2024-12-28 PROCEDURE — 2500000001 HC RX 250 WO HCPCS SELF ADMINISTERED DRUGS (ALT 637 FOR MEDICARE OP): Performed by: EMERGENCY MEDICINE

## 2024-12-28 PROCEDURE — 36415 COLL VENOUS BLD VENIPUNCTURE: CPT | Performed by: EMERGENCY MEDICINE

## 2024-12-28 PROCEDURE — 84702 CHORIONIC GONADOTROPIN TEST: CPT | Performed by: EMERGENCY MEDICINE

## 2024-12-28 PROCEDURE — 76815 OB US LIMITED FETUS(S): CPT | Mod: FOREIGN READ | Performed by: RADIOLOGY

## 2024-12-28 PROCEDURE — 99284 EMERGENCY DEPT VISIT MOD MDM: CPT | Performed by: EMERGENCY MEDICINE

## 2024-12-28 PROCEDURE — 81001 URINALYSIS AUTO W/SCOPE: CPT

## 2024-12-28 PROCEDURE — 2500000004 HC RX 250 GENERAL PHARMACY W/ HCPCS (ALT 636 FOR OP/ED): Performed by: EMERGENCY MEDICINE

## 2024-12-28 PROCEDURE — 76817 TRANSVAGINAL US OBSTETRIC: CPT | Mod: FOREIGN READ | Performed by: RADIOLOGY

## 2024-12-28 RX ORDER — CEPHALEXIN 500 MG/1
500 CAPSULE ORAL 3 TIMES DAILY
Qty: 15 CAPSULE | Refills: 0 | Status: SHIPPED | OUTPATIENT
Start: 2024-12-28 | End: 2025-01-02

## 2024-12-28 RX ORDER — ONDANSETRON 4 MG/1
4 TABLET, ORALLY DISINTEGRATING ORAL ONCE
Status: COMPLETED | OUTPATIENT
Start: 2024-12-28 | End: 2024-12-28

## 2024-12-28 RX ORDER — CEPHALEXIN 500 MG/1
500 CAPSULE ORAL ONCE
Status: COMPLETED | OUTPATIENT
Start: 2024-12-28 | End: 2024-12-28

## 2024-12-28 RX ADMIN — ONDANSETRON 4 MG: 4 TABLET, ORALLY DISINTEGRATING ORAL at 11:12

## 2024-12-28 RX ADMIN — CEPHALEXIN 500 MG: 500 CAPSULE ORAL at 10:24

## 2024-12-28 ASSESSMENT — LIFESTYLE VARIABLES
HAVE PEOPLE ANNOYED YOU BY CRITICIZING YOUR DRINKING: NO
EVER FELT BAD OR GUILTY ABOUT YOUR DRINKING: NO
EVER HAD A DRINK FIRST THING IN THE MORNING TO STEADY YOUR NERVES TO GET RID OF A HANGOVER: NO
TOTAL SCORE: 0
HAVE YOU EVER FELT YOU SHOULD CUT DOWN ON YOUR DRINKING: NO

## 2024-12-28 ASSESSMENT — PAIN DESCRIPTION - ORIENTATION: ORIENTATION: RIGHT;LEFT;LOWER

## 2024-12-28 ASSESSMENT — PAIN DESCRIPTION - PAIN TYPE: TYPE: ACUTE PAIN

## 2024-12-28 ASSESSMENT — PAIN SCALES - GENERAL: PAINLEVEL_OUTOF10: 10 - WORST POSSIBLE PAIN

## 2024-12-28 ASSESSMENT — PAIN - FUNCTIONAL ASSESSMENT: PAIN_FUNCTIONAL_ASSESSMENT: 0-10

## 2024-12-28 ASSESSMENT — COLUMBIA-SUICIDE SEVERITY RATING SCALE - C-SSRS
6. HAVE YOU EVER DONE ANYTHING, STARTED TO DO ANYTHING, OR PREPARED TO DO ANYTHING TO END YOUR LIFE?: NO
2. HAVE YOU ACTUALLY HAD ANY THOUGHTS OF KILLING YOURSELF?: NO
1. IN THE PAST MONTH, HAVE YOU WISHED YOU WERE DEAD OR WISHED YOU COULD GO TO SLEEP AND NOT WAKE UP?: NO

## 2024-12-28 ASSESSMENT — PAIN DESCRIPTION - LOCATION: LOCATION: ABDOMEN

## 2024-12-28 NOTE — Clinical Note
Chaparrita Larry was seen and treated in our emergency department on 12/28/2024.  She may return to work on 01/07/2025.       If you have any questions or concerns, please don't hesitate to call.      Caroline Cool RN

## 2024-12-28 NOTE — ED PROVIDER NOTES
HPI   Chief Complaint   Patient presents with    Abdominal Pain     Pt with bilateral lower abdominal pain. Pt thinks she could possibly be pregnant. Pt also complains of nausea.        Patient is a 34-year-old female, G3, P3 presenting [facility] ED for bilateral lower abdominal pain, increased urinary frequency, pain on urination for the last 4 to 5 days.  Patient reports that her abdominal pain started around the same time.  Patient reports some nausea.  Patient denies any vomiting, chest pain, shortness of breath, fever, chills, blood in the urine or stool, abnormal vaginal bleeding/discharge, dizziness, headache, or weakness.  Patient reports her last period was mid to in November.  Patient reports she was sexually active last approximately 2-3 weeks ago.              Patient History   No past medical history on file.  Past Surgical History:   Procedure Laterality Date    OTHER SURGICAL HISTORY  01/17/2022    Lumpectomy     No family history on file.  Social History     Tobacco Use    Smoking status: Not on file    Smokeless tobacco: Not on file   Substance Use Topics    Alcohol use: Not on file    Drug use: Not on file       Physical Exam   ED Triage Vitals [12/28/24 0714]   Temperature Heart Rate Respirations BP   36.8 °C (98.2 °F) 71 13 119/76      Pulse Ox Temp Source Heart Rate Source Patient Position   100 % Temporal Monitor Sitting      BP Location FiO2 (%)     Right arm --       Physical Exam  Constitutional:       Appearance: Normal appearance.   HENT:      Head: Normocephalic and atraumatic.      Nose: Nose normal.      Mouth/Throat:      Mouth: Mucous membranes are moist.      Pharynx: Oropharynx is clear.   Eyes:      Extraocular Movements: Extraocular movements intact.      Conjunctiva/sclera: Conjunctivae normal.      Pupils: Pupils are equal, round, and reactive to light.   Cardiovascular:      Rate and Rhythm: Normal rate and regular rhythm.      Pulses: Normal pulses.      Heart sounds: Normal  heart sounds.   Pulmonary:      Effort: Pulmonary effort is normal.      Breath sounds: Normal breath sounds.   Abdominal:      General: Abdomen is flat. Bowel sounds are normal.      Palpations: Abdomen is soft.      Tenderness: There is abdominal tenderness in the suprapubic area.   Musculoskeletal:         General: Normal range of motion.      Cervical back: Normal range of motion and neck supple.   Skin:     General: Skin is warm and dry.      Capillary Refill: Capillary refill takes less than 2 seconds.   Neurological:      General: No focal deficit present.      Mental Status: She is alert and oriented to person, place, and time. Mental status is at baseline.   Psychiatric:         Mood and Affect: Mood normal.         Behavior: Behavior normal.           ED Course & MDM   Diagnoses as of 12/28/24 1616   Acute cystitis without hematuria   Abdominal pain, generalized   First trimester pregnancy ([facility])                 No data recorded     [place] Coma Scale Score: 15 (12/28/24 0715 : Payal Goins RN)                           Medical Decision Making  Patient is a 34 y.o. female who presents to [facility] ED for Abdominal Pain (Pt with bilateral lower abdominal pain. Pt thinks she could possibly be pregnant. Pt also complains of nausea. ). On initial ED evaluation, patient found to be in no acute distress. Per HPI, concern to evaluate and treat for differential diagnosis including, but not limited to UTI versus cystitis versus pyelonephritis.  Obtaining infectious lab workup, UA, hCG.  Patient UA showed minor signs of infection.  Patient treated with Keflex in ED.  Patient hCG resulted at 35,488.  Ultrasound pelvis obtained to rule out ectopic pregnancy versus intrauterine pregnancy.  Ultrasound pelvis OB showed a single live intrauterine gestation approximately 6 weeks and 5 days.  Patient made aware of findings.  Patient to follow-up with her OB/GYN doctor for further evaluation and  management.      Diagnostic findings and treatment plan discussed with patient/family. They are amenable to plan. Rx given for Keflex. Patient to follow up with PCP, OB/GYN outpatient,referrals provided. Anticipatory guidance and return precautions provided.  Patient otherwise stable for discharge.          Procedure  Procedures     Dilia De La Rosa MD  Resident  12/28/24 4069      The patient was seen by the resident/fellow.  I have personally performed a substantive portion of the encounter.  I have seen and examined the patient; agree with the workup, evaluation, MDM, management and diagnosis.  The care plan has been discussed with the resident; I have reviewed the resident’s note and agree with the documented findings.                                                    Jean Claude Marley,   01/01/25 1148

## 2024-12-28 NOTE — DISCHARGE INSTRUCTIONS
You have a confirmed intrauterine gestation on your ultrasound today.  Please follow-up with your OB/GYN for further evaluation, management.  Please return to closest ED if you have any worsening symptoms such as excessive vaginal bleeding/discharge, weakness, lightheadedness, loss of function.

## 2024-12-29 LAB — BACTERIA UR CULT: NORMAL

## 2025-02-14 ENCOUNTER — APPOINTMENT (OUTPATIENT)
Dept: INTEGRATIVE MEDICINE | Facility: CLINIC | Age: 35
End: 2025-02-14
Payer: COMMERCIAL

## 2025-02-20 NOTE — PROGRESS NOTES
Assessment/Plan   The patient's neck and lower back and mid back pain appear myofascial related to segmental dysfunction and possibility that symptoms also related to rheumatoid arthritis or lupus as the patient has reported diagnosis of these conditions by primary care.  Going through her chart I was not able to find any rheumatoid factor, CRP, CCP, ESR, or SHAKIRA testing that have been done and I did find imaging of the lower back and pelvis but I did not see any cervical spine imaging.  Given her history of diagnosis of these conditions and referring her to rheumatology for potential updated testing and evaluation of previous autoimmune diagnoses and this may help alleviate some of her symptoms which are chronic under appropriate care if needed.    No spine imaging results found for the past 12 months     CT abd/pelvis  -while this is not dedicated spine image I did not notice any major findings in the thoracolumbar spine. (RT)    XR LS  - IMPRESSION:  Unremarkable radiographs thoracic and lumbar spine.    Visits this year: 1    Subjective     HPI - Spinal pain 2025 -patient endorses chronic spinal pain since early adulthood endorsing constant severe pain in the base of neck upper and mid back and lower back.  Symptoms are all localized without radiation numbness or tingling.  She notes that she was diagnosed with rheumatoid arthritis and lupus by primary care years prior and was taking medications for these but notes that they did not help so she tapered off of them.  She is currently not pregnant had  in 2025.  Notes that she was getting relief with spinal manipulation in the past with my colleague.  She is also seeing pain management for her complaints.    Review of Systems   Constitutional:  Positive for fatigue. Negative for fever.   Eyes:  Negative for visual disturbance.   Respiratory:  Negative for shortness of breath.    Cardiovascular:  Negative for chest pain.    Gastrointestinal:  Negative for diarrhea, nausea and vomiting.   Genitourinary:  Negative for difficulty urinating and dysuria.   Musculoskeletal:  Positive for back pain and neck stiffness. Negative for joint swelling.   Skin:  Negative for color change and rash.   Neurological:  Negative for dizziness.   Psychiatric/Behavioral:  Negative for agitation.    All other systems reviewed and are negative.    Objective   Examination findings (e.g., palpation & ROM):   Decreased C/S and L/S ROM with pain, hypertonic and tender cervical and lumbar erectors  SLR BL 45 with tightness    Segmental joint dysfunction was identified in the following areas using motion palpation and/or pain provocation assessment:  Cervical: 7  Thoracic: 1, 5-8  Lumbopelvic:  1-2, BL SIJ    Physical Exam  Neurological:      General: No focal deficit present.      Mental Status: She is alert.      Sensory: Sensation is intact.      Motor: Motor function is intact.      Coordination: Coordination is intact.      Gait: Gait is intact.      Deep Tendon Reflexes: Reflexes are normal and symmetric.      Reflex Scores:       Tricep reflexes are 2+ on the right side and 2+ on the left side.       Bicep reflexes are 2+ on the right side and 2+ on the left side.       Brachioradialis reflexes are 2+ on the right side and 2+ on the left side.       Patellar reflexes are 2+ on the right side and 2+ on the left side.       Achilles reflexes are 2+ on the right side and 2+ on the left side.     Comments: BL hip extension 4/5  2/21/25       Plan   Today's treatment:  SMT to regions of segmental dysfunction identified on exam, using age-appropriate force, and manual diversified technique.   STM to patient tolerance to hypertonic paraspinal muscles in LS 2m  Manual dynamic stretching of the gluteal muscles, hamstrings, upper trapezius  Patient noted improved mobility and reduced pain post-treatment    Treatment Plan:   The patient and I discussed the risks and  benefits of chiropractic care. Based on the patient's subjective complaints along with the examination findings, it is advised that a course of chiropractic treatment be initiated. The patient provided consent for care. The patient tolerated today's treatment with little or no additional discomfort and was instructed to contact the office for questions or concerns. Will see patient once per week then every 2 weeks when symptoms become mild/manageable, further spaced apart contingent upon improvement.     This chart note was generated using dictation software, and as such, there may be typographical errors present. Abbreviations: Cervical spine (CS), cervical-thoracic (CT), Dry needling (DN), Flexion adduction internal rotation (FAIR), high velocity, low amplitude (HVLA), Lumbar spine (LS), Soft tissue manipulation (STM), spinal manipulative therapy (SMT), Straight leg raise (SLR), Thoracic spine (TS).

## 2025-02-21 ENCOUNTER — APPOINTMENT (OUTPATIENT)
Dept: INTEGRATIVE MEDICINE | Facility: CLINIC | Age: 35
End: 2025-02-21
Payer: COMMERCIAL

## 2025-02-21 DIAGNOSIS — G89.29 CHRONIC BILATERAL LOW BACK PAIN WITHOUT SCIATICA: ICD-10-CM

## 2025-02-21 DIAGNOSIS — M99.02 SOMATIC DYSFUNCTION OF THORACIC REGION: ICD-10-CM

## 2025-02-21 DIAGNOSIS — M99.01 CERVICAL SEGMENT DYSFUNCTION: Primary | ICD-10-CM

## 2025-02-21 DIAGNOSIS — M54.6 CHRONIC BILATERAL THORACIC BACK PAIN: ICD-10-CM

## 2025-02-21 DIAGNOSIS — M54.50 CHRONIC BILATERAL LOW BACK PAIN WITHOUT SCIATICA: ICD-10-CM

## 2025-02-21 DIAGNOSIS — M99.05 SOMATIC DYSFUNCTION OF PELVIS REGION: ICD-10-CM

## 2025-02-21 DIAGNOSIS — M54.2 NECK PAIN: ICD-10-CM

## 2025-02-21 DIAGNOSIS — M99.03 SOMATIC DYSFUNCTION OF LUMBAR REGION: ICD-10-CM

## 2025-02-21 DIAGNOSIS — G89.29 CHRONIC BILATERAL THORACIC BACK PAIN: ICD-10-CM

## 2025-02-21 PROCEDURE — 98941 CHIROPRACT MANJ 3-4 REGIONS: CPT | Performed by: CHIROPRACTOR

## 2025-02-21 PROCEDURE — 99213 OFFICE O/P EST LOW 20 MIN: CPT | Performed by: CHIROPRACTOR

## 2025-02-21 ASSESSMENT — ENCOUNTER SYMPTOMS
JOINT SWELLING: 0
DIARRHEA: 0
AGITATION: 0
DIZZINESS: 0
DYSURIA: 0
COLOR CHANGE: 0
VOMITING: 0
FATIGUE: 1
SHORTNESS OF BREATH: 0
DIFFICULTY URINATING: 0
NAUSEA: 0
BACK PAIN: 1
NECK STIFFNESS: 1
FEVER: 0

## 2025-02-28 ENCOUNTER — APPOINTMENT (OUTPATIENT)
Dept: INTEGRATIVE MEDICINE | Facility: CLINIC | Age: 35
End: 2025-02-28
Payer: COMMERCIAL

## 2025-03-04 ENCOUNTER — APPOINTMENT (OUTPATIENT)
Dept: INTEGRATIVE MEDICINE | Facility: CLINIC | Age: 35
End: 2025-03-04
Payer: COMMERCIAL

## 2025-03-11 ENCOUNTER — APPOINTMENT (OUTPATIENT)
Dept: INTEGRATIVE MEDICINE | Facility: CLINIC | Age: 35
End: 2025-03-11
Payer: COMMERCIAL

## 2025-03-14 ENCOUNTER — APPOINTMENT (OUTPATIENT)
Dept: INTEGRATIVE MEDICINE | Facility: CLINIC | Age: 35
End: 2025-03-14
Payer: COMMERCIAL

## 2025-03-18 ENCOUNTER — APPOINTMENT (OUTPATIENT)
Dept: INTEGRATIVE MEDICINE | Facility: CLINIC | Age: 35
End: 2025-03-18
Payer: COMMERCIAL

## 2025-03-18 DIAGNOSIS — G89.29 CHRONIC PRIMARY LOW BACK PAIN: Primary | ICD-10-CM

## 2025-03-18 DIAGNOSIS — M54.59 CHRONIC PRIMARY LOW BACK PAIN: Primary | ICD-10-CM

## 2025-03-18 PROCEDURE — 97811 ACUP 1/> W/O ESTIM EA ADD 15: CPT

## 2025-03-18 PROCEDURE — 97810 ACUP 1/> WO ESTIM 1ST 15 MIN: CPT

## 2025-03-18 NOTE — PROGRESS NOTES
Acupuncture Visit:     Subjective   Patient ID: Chaparrita Larry is a 34 y.o. female who presents for Back Pain    Update: 3/18/25  Tx 1/30    Patient reports she continues to suffer from low back pain but has been receiving chiropractic care which she notes has been helpful.    Previous Visit: 1/25/24 (JAREK)    This patient is an established patient who was previously seen and documented under [facility].  She was previously seen for chronic low back pain which she reports is the reason for her visit today.  Low back feels sore and achy after certain activities.  Pain level is around 8/10 today.        Session Information  Is this acupuncture treatment being billed to the patient's insurance company: Yes  This is visit number: 1  The patient has a total number of visits of: 30  Last Treatment date: 01/25/24  Name of Insurance Company: [employer]  Visit Type: Follow-up visit  Medical History Reviewed: I have reviewed pertinent medical history in EHR, and no contraindications are present to provide treatment  Description of present complaint: Wellbeing challenges, Chronic pain, Myofascial pain, Muscle tension         Review of Systems         Provider reviewed plan for the acupuncture session, precautions and contraindications. Patient/guardian/hospital staff has given consent to treat with full understanding of what to expect during the session. Before acupuncture began, provider explained to the patient to communicate at any time if the procedure was causing discomfort past their tolerance level. Patient agreed to advise acupuncturist. The acupuncturist counseled the patient on the risks of acupuncture treatment including pain, infection, bleeding, and no relief of pain. The patient was positioned comfortably. There was no evidence of infection at the site of needle insertions.    Objective   Physical Exam         Treatment Plan  Treatment Goals: Wellbeing improvement, Pain management    Acupuncture Treatment  Patient  Position: Prone on a table  Acupuncture Needling: Yes  Needle Guage: 32 guage /.25/ Purple seirin  Body Points: With retention  Body Points - Bilateral: BL23,24,25,26,62  Auricular Points: No  Electroacupuncture Used: No  Other Techniques Utilized: TDP Lamp, Topicals  Topicals Description: On low back  TDP Lamp Descripton: 20mins on low back  Needle Count In: 10  Needle Count Out: 10  Needle Retention Time (min): 30 minutes  Total Face to Face Time (min): 25 minutes              Assessment/Plan   Diagnoses and all orders for this visit:  Chronic primary low back pain

## 2025-03-21 ENCOUNTER — APPOINTMENT (OUTPATIENT)
Dept: INTEGRATIVE MEDICINE | Facility: CLINIC | Age: 35
End: 2025-03-21
Payer: COMMERCIAL

## 2025-03-24 ENCOUNTER — APPOINTMENT (OUTPATIENT)
Dept: RHEUMATOLOGY | Facility: CLINIC | Age: 35
End: 2025-03-24
Payer: COMMERCIAL

## 2025-03-25 ENCOUNTER — APPOINTMENT (OUTPATIENT)
Dept: INTEGRATIVE MEDICINE | Facility: CLINIC | Age: 35
End: 2025-03-25
Payer: COMMERCIAL

## 2025-04-01 ENCOUNTER — APPOINTMENT (OUTPATIENT)
Dept: INTEGRATIVE MEDICINE | Facility: CLINIC | Age: 35
End: 2025-04-01
Payer: COMMERCIAL

## 2025-06-06 ENCOUNTER — APPOINTMENT (OUTPATIENT)
Dept: INTEGRATIVE MEDICINE | Facility: CLINIC | Age: 35
End: 2025-06-06
Payer: COMMERCIAL

## 2025-06-10 ENCOUNTER — APPOINTMENT (OUTPATIENT)
Dept: INTEGRATIVE MEDICINE | Facility: CLINIC | Age: 35
End: 2025-06-10
Payer: COMMERCIAL

## 2025-06-10 DIAGNOSIS — M54.59 CHRONIC PRIMARY LOW BACK PAIN: Primary | ICD-10-CM

## 2025-06-10 DIAGNOSIS — G89.29 CHRONIC PRIMARY LOW BACK PAIN: Primary | ICD-10-CM

## 2025-06-10 PROCEDURE — 97811 ACUP 1/> W/O ESTIM EA ADD 15: CPT

## 2025-06-10 PROCEDURE — 97810 ACUP 1/> WO ESTIM 1ST 15 MIN: CPT

## 2025-06-17 ENCOUNTER — APPOINTMENT (OUTPATIENT)
Dept: INTEGRATIVE MEDICINE | Facility: CLINIC | Age: 35
End: 2025-06-17
Payer: COMMERCIAL

## 2025-06-17 DIAGNOSIS — G89.29 CHRONIC PRIMARY LOW BACK PAIN: Primary | ICD-10-CM

## 2025-06-17 DIAGNOSIS — M54.59 CHRONIC PRIMARY LOW BACK PAIN: Primary | ICD-10-CM

## 2025-06-17 PROCEDURE — 97810 ACUP 1/> WO ESTIM 1ST 15 MIN: CPT

## 2025-06-17 PROCEDURE — 97811 ACUP 1/> W/O ESTIM EA ADD 15: CPT

## 2025-06-24 ENCOUNTER — APPOINTMENT (OUTPATIENT)
Dept: INTEGRATIVE MEDICINE | Facility: CLINIC | Age: 35
End: 2025-06-24
Payer: COMMERCIAL

## 2025-06-24 DIAGNOSIS — M54.59 CHRONIC PRIMARY LOW BACK PAIN: Primary | ICD-10-CM

## 2025-06-24 DIAGNOSIS — M54.2 NECK PAIN: ICD-10-CM

## 2025-06-24 DIAGNOSIS — G89.29 CHRONIC PRIMARY LOW BACK PAIN: Primary | ICD-10-CM

## 2025-06-24 PROCEDURE — 97810 ACUP 1/> WO ESTIM 1ST 15 MIN: CPT

## 2025-06-24 PROCEDURE — 97811 ACUP 1/> W/O ESTIM EA ADD 15: CPT

## 2025-06-24 NOTE — PROGRESS NOTES
"Acupuncture Visit:     Subjective   Patient ID: Chaparrita Larry is a 34 y.o. female who presents for Back Pain    Update: 6/10/25  Tx 2/30    Patient reports to this visit with heightened emotion and was crying.  She states she was evicted from her apartment and wants to move to [place] to \"get away from family drama.\"  She states she is also working on her sobriety and has been sober for 2 months but recently relapsed and is working to get back on track.    Back hurting today.     Prev: 3/18/25  Tx 1/30    Patient reports she continues to suffer from low back pain but has been receiving chiropractic care which she notes has been helpful.    Previous Visit: 1/25/24 (JAREK)    This patient is an established patient who was previously seen and documented under [facility].  She was previously seen for chronic low back pain which she reports is the reason for her visit today.  Low back feels sore and achy after certain activities.  Pain level is around 8/10 today.        Session Information  Is this acupuncture treatment being billed to the patient's insurance company: Yes  This is visit number: 2  The patient has a total number of visits of: 30  Initial Acupuncture Treatment date: 03/18/25  Last Treatment date: 03/18/25  Name of Insurance Company: [email or web address]  Visit Type: Follow-up visit  Medical History Reviewed: I have reviewed pertinent medical history in EHR, and no contraindications are present to provide treatment  Description of present complaint: Wellbeing challenges, Chronic pain         Review of Systems         Provider reviewed plan for the acupuncture session, precautions and contraindications. Patient/guardian/hospital staff has given consent to treat with full understanding of what to expect during the session. Before acupuncture began, provider explained to the patient to communicate at any time if the procedure was causing discomfort past their tolerance level. Patient agreed to advise acupuncturist. The " acupuncturist counseled the patient on the risks of acupuncture treatment including pain, infection, bleeding, and no relief of pain. The patient was positioned comfortably. There was no evidence of infection at the site of needle insertions.    Objective   Physical Exam         Treatment Plan  Treatment Goals: Wellbeing improvement, Pain management    Acupuncture Treatment  Patient Position: Prone on a table  Acupuncture Needling: Yes  Needle Guage: 36 guage /.20/ Blue seirin, 40 guage /.16/ Red seirin  Body Points: With retention  Body Points - Bilateral: BL23,52,62; GB34,41; KD3,7  Auricular Points: Yes  Auricular Points - Bilateral: NADA Protocol  Other Techniques Utilized: TDP Lamp, Topicals  Topicals Description: Po Sum On  TDP Lamp Descripton: 20mins on low back  Needle Count In: 24  Needle Count Out: 24  Needle Retention Time (min): 30 minutes  Total Face to Face Time (min): 25 minutes              Assessment/Plan   Diagnoses and all orders for this visit:  Chronic primary low back pain

## 2025-07-01 NOTE — PROGRESS NOTES
"Acupuncture Visit:     Subjective   Patient ID: Chaparrita Larry is a 35 y.o. female who presents for Back Pain    Insurance: [employer]  Initial Visit: 3/18/25    Update: 6/17/25  Tx 3/30    Feeling much better mentally today.  Still working on sobriety.  Low back of concern but not as bad as it has been.    Prev: 6/10/25  Tx 2/30    Patient reports to this visit with heightened emotion and was crying.  She states she was evicted from her apartment and wants to move to [place] to \"get away from family drama.\"  She states she is also working on her sobriety and has been sober for 2 months but recently relapsed and is working to get back on track.    Back hurting today.     Previous Visit: 1/25/24 (JAREK)    This patient is an established patient who was previously seen and documented under [facility]  She was previously seen for chronic low back pain which she reports is the reason for her visit today.  Low back feels sore and achy after certain activities.  Pain level is around 8/10 today.        Session Information  Is this acupuncture treatment being billed to the patient's insurance company: Yes  This is visit number: 3  The patient has a total number of visits of: 30  Initial Acupuncture Treatment date: 03/18/25  Last Treatment date: 06/10/25  Name of Insurance Company: [employer]  Visit Type: Follow-up visit  Medical History Reviewed: I have reviewed pertinent medical history in EHR, and no contraindications are present to provide treatment  Description of present complaint: Wellbeing challenges, Chronic pain  Since Last Visit, Patient Noted Improved: Wellbeing challenge, Chronic Pain         Review of Systems         Provider reviewed plan for the acupuncture session, precautions and contraindications. Patient/guardian/hospital staff has given consent to treat with full understanding of what to expect during the session. Before acupuncture began, provider explained to the patient to communicate at any time if the " procedure was causing discomfort past their tolerance level. Patient agreed to advise acupuncturist. The acupuncturist counseled the patient on the risks of acupuncture treatment including pain, infection, bleeding, and no relief of pain. The patient was positioned comfortably. There was no evidence of infection at the site of needle insertions.    Objective   Physical Exam         Treatment Plan  Treatment Goals: Wellbeing improvement, Pain management, Relaxation, Stress reduction    Acupuncture Treatment  Patient Position: Prone on a table  Acupuncture Needling: Yes  Needle Guage: 36 guage /.20/ Blue seirin, 32 guage /.25/ Purple seirin  Body Points: With retention  Body Points - Left: BL62  Body Points - Bilateral: BL23,40,52; GV4; KD3,7  Body Points - Right: SI3  Other Techniques Utilized: TDP Lamp, Topicals  Topicals Description: Po Sum On  TDP Lamp Descripton: 20mins on low back  Needle Count In: 13  Needle Count Out: 13  Needle Retention Time (min): 30 minutes  Total Face to Face Time (min): 25 minutes              Assessment/Plan   Diagnoses and all orders for this visit:  Chronic primary low back pain

## 2025-07-11 ENCOUNTER — APPOINTMENT (OUTPATIENT)
Dept: INTEGRATIVE MEDICINE | Facility: CLINIC | Age: 35
End: 2025-07-11
Payer: COMMERCIAL

## 2025-07-23 NOTE — PROGRESS NOTES
"Acupuncture Visit:     Subjective   Patient ID: Chaparrita Larry is a 35 y.o. female who presents for Back Pain and Neck Pain    Insurance: [email or web address]  Initial Visit: 3/18/25    Update: 6/24/25  Tx 4/30    Patient reports she continues to work on her sobriety.  Low back feels sore today.    Prev: 6/17/25  Tx 3/30    Feeling much better mentally today.  Still working on sobriety.  Low back of concern but not as bad as it has been.    Prev: 6/10/25  Tx 2/30    Patient reports to this visit with heightened emotion and was crying.  She states she was evicted from her apartment and wants to move to [place] to \"get away from family drama.\"  She states she is also working on her sobriety and has been sober for 2 months but recently relapsed and is working to get back on track.    Back hurting today.     Previous Visit: 1/25/24 (JAREK)    This patient is an established patient who was previously seen and documented under [facility].  She was previously seen for chronic low back pain which she reports is the reason for her visit today.  Low back feels sore and achy after certain activities.  Pain level is around 8/10 today.        Session Information  Is this acupuncture treatment being billed to the patient's insurance company: Yes  This is visit number: 4  The patient has a total number of visits of: 30  Initial Acupuncture Treatment date: 03/18/25  Last Treatment date: 06/17/25  Name of Insurance Company: [employer]  Visit Type: Follow-up visit  Medical History Reviewed: I have reviewed pertinent medical history in EHR, and no contraindications are present to provide treatment  Description of present complaint: Wellbeing challenges, Chronic pain         Review of Systems         Provider reviewed plan for the acupuncture session, precautions and contraindications. Patient/guardian/hospital staff has given consent to treat with full understanding of what to expect during the session. Before acupuncture began, provider explained " to the patient to communicate at any time if the procedure was causing discomfort past their tolerance level. Patient agreed to advise acupuncturist. The acupuncturist counseled the patient on the risks of acupuncture treatment including pain, infection, bleeding, and no relief of pain. The patient was positioned comfortably. There was no evidence of infection at the site of needle insertions.    Objective   Physical Exam         Treatment Plan  Treatment Goals: Wellbeing improvement, Pain management                   Assessment/Plan   Diagnoses and all orders for this visit:  Chronic primary low back pain  Neck pain

## 2025-08-01 ENCOUNTER — APPOINTMENT (OUTPATIENT)
Dept: INTEGRATIVE MEDICINE | Facility: CLINIC | Age: 35
End: 2025-08-01
Payer: COMMERCIAL

## 2025-08-06 ENCOUNTER — APPOINTMENT (OUTPATIENT)
Dept: INTEGRATIVE MEDICINE | Facility: CLINIC | Age: 35
End: 2025-08-06
Payer: COMMERCIAL

## 2025-08-07 ASSESSMENT — ENCOUNTER SYMPTOMS
DIZZINESS: 0
SHORTNESS OF BREATH: 0
FEVER: 0
JOINT SWELLING: 0
DIFFICULTY URINATING: 0
BACK PAIN: 1
VOMITING: 0
DIARRHEA: 0
COLOR CHANGE: 0
DYSURIA: 0
FATIGUE: 1
AGITATION: 0
NAUSEA: 0
NECK STIFFNESS: 1

## 2025-08-07 NOTE — PROGRESS NOTES
Assessment/Plan   The patient's neck and lower back and mid back pain appear myofascial related to segmental dysfunction and possibility that symptoms also related to rheumatoid arthritis or lupus as the patient has reported diagnosis of these conditions by primary care.  Going through her chart I was not able to find any rheumatoid factor, CRP, CCP, ESR, or SHAKIRA testing that have been done and I did find imaging of the lower back and pelvis but I did not see any cervical spine imaging.  Given her history of diagnosis of these conditions and referring her to rheumatology for potential updated testing and evaluation of previous autoimmune diagnoses and this may help alleviate some of her symptoms which are chronic under appropriate care if needed.    No spine imaging results found for the past 12 months     CT abd/pelvis  -while this is not dedicated spine image I did not notice any major findings in the thoracolumbar spine. (RT)    XR LS  - IMPRESSION:  Unremarkable radiographs thoracic and lumbar spine.    Visits this year: 3    Subjective     HPI - Spinal pain 2025 -patient endorses chronic spinal pain since early adulthood endorsing constant severe pain in the base of neck upper and mid back and lower back.  Symptoms are all localized without radiation numbness or tingling.  She notes that she was diagnosed with rheumatoid arthritis and lupus by primary care years prior and was taking medications for these but notes that they did not help so she tapered off of them.  She is currently not pregnant had  in 2025.  Notes that she was getting relief with spinal manipulation in the past with my colleague.  She is also seeing pain management for her complaints.    Review of Systems   Constitutional:  Positive for fatigue. Negative for fever.   Eyes:  Negative for visual disturbance.   Respiratory:  Negative for shortness of breath.    Cardiovascular:  Negative for chest pain.    Gastrointestinal:  Negative for diarrhea, nausea and vomiting.   Genitourinary:  Negative for difficulty urinating and dysuria.   Musculoskeletal:  Positive for back pain and neck stiffness. Negative for joint swelling.   Skin:  Negative for color change and rash.   Neurological:  Negative for dizziness.   Psychiatric/Behavioral:  Negative for agitation.    All other systems reviewed and are negative.    Objective   Examination findings (e.g., palpation & ROM):   Decreased C/S and L/S ROM with pain, hypertonic and tender cervical and lumbar erectors  SLR BL 45 with tightness    Segmental joint dysfunction was identified in the following areas using motion palpation and/or pain provocation assessment:  Cervical: 7  Thoracic: 1, 5-8  Lumbopelvic:  1-2, BL SIJ    Physical Exam    Neurological:      General: No focal deficit present.      Mental Status: She is alert.      Sensory: Sensation is intact.      Motor: Motor function is intact.      Coordination: Coordination is intact.      Gait: Gait is intact.      Deep Tendon Reflexes: Reflexes are normal and symmetric.      Reflex Scores:       Tricep reflexes are 2+ on the right side and 2+ on the left side.       Bicep reflexes are 2+ on the right side and 2+ on the left side.       Brachioradialis reflexes are 2+ on the right side and 2+ on the left side.       Patellar reflexes are 2+ on the right side and 2+ on the left side.       Achilles reflexes are 2+ on the right side and 2+ on the left side.     Comments: BL hip extension 4/5  2/21/25     Plan   Today's treatment:  SMT to regions of segmental dysfunction identified on exam, using age-appropriate force, and manual diversified technique.   STM to patient tolerance to hypertonic paraspinal muscles in LS 2m  Manual dynamic stretching of the gluteal muscles, hamstrings, upper trapezius  Patient noted improved mobility and reduced pain post-treatment    Treatment Plan:   The patient and I discussed the risks and  benefits of chiropractic care. Based on the patient's subjective complaints along with the examination findings, it is advised that a course of chiropractic treatment be initiated. The patient provided consent for care. The patient tolerated today's treatment with little or no additional discomfort and was instructed to contact the office for questions or concerns. Will see patient once per week then every 2 weeks when symptoms become mild/manageable, further spaced apart contingent upon improvement.     This chart note was generated using dictation software, and as such, there may be typographical errors present. Abbreviations: Cervical spine (CS), cervical-thoracic (CT), Dry needling (DN), Flexion adduction internal rotation (FAIR), high velocity, low amplitude (HVLA), Lumbar spine (LS), Soft tissue manipulation (STM), spinal manipulative therapy (SMT), Straight leg raise (SLR), Thoracic spine (TS).

## 2025-08-08 ENCOUNTER — APPOINTMENT (OUTPATIENT)
Dept: INTEGRATIVE MEDICINE | Facility: CLINIC | Age: 35
End: 2025-08-08
Payer: COMMERCIAL

## 2025-08-08 DIAGNOSIS — G89.29 CHRONIC PRIMARY LOW BACK PAIN: ICD-10-CM

## 2025-08-08 DIAGNOSIS — G89.29 CHRONIC BILATERAL LOW BACK PAIN WITHOUT SCIATICA: ICD-10-CM

## 2025-08-08 DIAGNOSIS — M54.50 CHRONIC BILATERAL LOW BACK PAIN WITHOUT SCIATICA: ICD-10-CM

## 2025-08-08 DIAGNOSIS — M99.05 SOMATIC DYSFUNCTION OF PELVIS REGION: ICD-10-CM

## 2025-08-08 DIAGNOSIS — M54.59 CHRONIC PRIMARY LOW BACK PAIN: ICD-10-CM

## 2025-08-08 DIAGNOSIS — M99.01 CERVICAL SEGMENT DYSFUNCTION: ICD-10-CM

## 2025-08-08 DIAGNOSIS — M54.2 NECK PAIN: ICD-10-CM

## 2025-08-08 DIAGNOSIS — M99.03 SOMATIC DYSFUNCTION OF LUMBAR REGION: ICD-10-CM

## 2025-08-08 DIAGNOSIS — M99.02 SOMATIC DYSFUNCTION OF THORACIC REGION: Primary | ICD-10-CM

## 2025-08-15 ENCOUNTER — APPOINTMENT (OUTPATIENT)
Dept: INTEGRATIVE MEDICINE | Facility: CLINIC | Age: 35
End: 2025-08-15
Payer: COMMERCIAL

## 2025-11-26 ENCOUNTER — APPOINTMENT (OUTPATIENT)
Dept: INTEGRATIVE MEDICINE | Facility: CLINIC | Age: 35
End: 2025-11-26
Payer: COMMERCIAL